# Patient Record
Sex: FEMALE | Race: WHITE | NOT HISPANIC OR LATINO | URBAN - METROPOLITAN AREA
[De-identification: names, ages, dates, MRNs, and addresses within clinical notes are randomized per-mention and may not be internally consistent; named-entity substitution may affect disease eponyms.]

---

## 2020-04-03 ENCOUNTER — OFFICE VISIT (OUTPATIENT)
Dept: URGENT CARE | Facility: CLINIC | Age: 75
End: 2020-04-03
Payer: COMMERCIAL

## 2020-04-03 ENCOUNTER — TELEPHONE (OUTPATIENT)
Dept: URGENT CARE | Facility: CLINIC | Age: 75
End: 2020-04-03

## 2020-04-03 VITALS
TEMPERATURE: 99 F | DIASTOLIC BLOOD PRESSURE: 94 MMHG | OXYGEN SATURATION: 95 % | WEIGHT: 165 LBS | HEART RATE: 82 BPM | SYSTOLIC BLOOD PRESSURE: 165 MMHG | HEIGHT: 64 IN | BODY MASS INDEX: 28.17 KG/M2

## 2020-04-03 DIAGNOSIS — R94.31 ABNORMAL EKG: ICD-10-CM

## 2020-04-03 DIAGNOSIS — R03.0 ELEVATED BLOOD PRESSURE READING: ICD-10-CM

## 2020-04-03 DIAGNOSIS — R07.89 OTHER CHEST PAIN: ICD-10-CM

## 2020-04-03 DIAGNOSIS — J06.9 UPPER RESPIRATORY TRACT INFECTION, UNSPECIFIED TYPE: ICD-10-CM

## 2020-04-03 DIAGNOSIS — R53.83 FATIGUE, UNSPECIFIED TYPE: Primary | ICD-10-CM

## 2020-04-03 LAB
CTP QC/QA: YES
FLUAV AG NPH QL: NEGATIVE
FLUBV AG NPH QL: NEGATIVE

## 2020-04-03 PROCEDURE — 87804 POCT INFLUENZA A/B: ICD-10-PCS | Mod: 59,QW,S$GLB, | Performed by: NURSE PRACTITIONER

## 2020-04-03 PROCEDURE — 99204 OFFICE O/P NEW MOD 45 MIN: CPT | Mod: 25,S$GLB,, | Performed by: NURSE PRACTITIONER

## 2020-04-03 PROCEDURE — 93005 EKG 12-LEAD: ICD-10-PCS | Mod: S$GLB,,, | Performed by: NURSE PRACTITIONER

## 2020-04-03 PROCEDURE — 99204 PR OFFICE/OUTPT VISIT, NEW, LEVL IV, 45-59 MIN: ICD-10-PCS | Mod: 25,S$GLB,, | Performed by: NURSE PRACTITIONER

## 2020-04-03 PROCEDURE — 71046 X-RAY EXAM CHEST 2 VIEWS: CPT | Mod: S$GLB,,, | Performed by: RADIOLOGY

## 2020-04-03 PROCEDURE — 87804 INFLUENZA ASSAY W/OPTIC: CPT | Mod: QW,S$GLB,, | Performed by: NURSE PRACTITIONER

## 2020-04-03 PROCEDURE — 93010 EKG 12-LEAD: ICD-10-PCS | Mod: S$GLB,,, | Performed by: INTERNAL MEDICINE

## 2020-04-03 PROCEDURE — 93010 ELECTROCARDIOGRAM REPORT: CPT | Mod: S$GLB,,, | Performed by: INTERNAL MEDICINE

## 2020-04-03 PROCEDURE — 93005 ELECTROCARDIOGRAM TRACING: CPT | Mod: S$GLB,,, | Performed by: NURSE PRACTITIONER

## 2020-04-03 PROCEDURE — 71046 XR CHEST PA AND LATERAL: ICD-10-PCS | Mod: S$GLB,,, | Performed by: RADIOLOGY

## 2020-04-03 NOTE — PROGRESS NOTES
"Subjective:       Patient ID: Nanette Caceres is a 74 y.o. female.    Vitals:  height is 5' 4" (1.626 m) and weight is 74.8 kg (165 lb). Her temperature is 99.2 °F (37.3 °C). Her blood pressure is 165/94 (abnormal) and her pulse is 82. Her oxygen saturation is 95%.     Chief Complaint: Sinus Problem    73 yo female presents today with chief complaint of sinus problem for 11 days. Pt also complains of postnasal drip and left-sided myalgias. Pt has PMH of pneumonia. Pt took Claritin and 400 mg ibuprofen (2x day) with mild relief.    Sinus Problem   This is a new problem. The current episode started 1 to 4 weeks ago. The problem is unchanged. There has been no fever. Her pain is at a severity of 0/10. She is experiencing no pain. Pertinent negatives include no chills, congestion, coughing, diaphoresis, ear pain, shortness of breath, sinus pressure or sore throat. Past treatments include acetaminophen and oral decongestants. The treatment provided mild relief.       Constitution: Positive for fatigue. Negative for chills, sweating, fever and international travel in last 60 days.   HENT: Positive for postnasal drip. Negative for ear pain, congestion, sinus pain, sinus pressure, sore throat and voice change.    Neck: Negative for painful lymph nodes.   Cardiovascular: Negative for chest pain, palpitations and sob on exertion.   Eyes: Negative for eye redness.   Respiratory: Negative for chest tightness, cough, sputum production, bloody sputum, COPD, shortness of breath, stridor, wheezing and asthma.    Gastrointestinal: Negative for nausea and vomiting.   Musculoskeletal: Positive for muscle ache.        Reports dull ache on left side of chest under her left arm.  States pain is not there all the time but notices pain at rest.  Denies any left-sided chest pain or palpitations.     Skin: Negative for rash.   Allergic/Immunologic: Negative for seasonal allergies, asthma and flu shot.   Hematologic/Lymphatic: Negative for " swollen lymph nodes.       Objective:      Physical Exam   Constitutional: She is oriented to person, place, and time. She appears well-developed and well-nourished. She is cooperative.  Non-toxic appearance. She does not have a sickly appearance. She does not appear ill. No distress.   Sitting upright in exam chair, breathing is unlabored, speaking in full sentences at ease and in NAD.   HENT:   Head: Normocephalic and atraumatic.   Right Ear: Hearing, tympanic membrane, external ear and ear canal normal.   Left Ear: Hearing, tympanic membrane, external ear and ear canal normal.   Nose: Nose normal. No mucosal edema, rhinorrhea or nasal deformity. No epistaxis. Right sinus exhibits no maxillary sinus tenderness and no frontal sinus tenderness. Left sinus exhibits no maxillary sinus tenderness and no frontal sinus tenderness.   Mouth/Throat: Uvula is midline and mucous membranes are normal. No trismus in the jaw. Normal dentition. No uvula swelling. Posterior oropharyngeal erythema present. No oropharyngeal exudate or posterior oropharyngeal edema.       Eyes: Pupils are equal, round, and reactive to light. Conjunctivae and lids are normal. No scleral icterus.   Neck: Trachea normal, normal range of motion, full passive range of motion without pain and phonation normal. Neck supple. No neck rigidity. No edema and no erythema present.   Cardiovascular: Normal rate, regular rhythm, S1 normal, S2 normal, normal heart sounds, intact distal pulses and normal pulses.   Pulmonary/Chest: Effort normal and breath sounds normal. No respiratory distress. She has no decreased breath sounds. She has no wheezes. She has no rhonchi. She has no rales.   Abdominal: Normal appearance.   Musculoskeletal: Normal range of motion. She exhibits no edema or deformity.   Neurological: She is alert and oriented to person, place, and time. She is not disoriented. She exhibits normal muscle tone. Coordination normal.   Skin: Skin is warm, dry,  intact, not diaphoretic and not pale.   Psychiatric: She has a normal mood and affect. Her speech is normal and behavior is normal. Judgment and thought content normal. Cognition and memory are normal.   Nursing note and vitals reviewed.        74 year old female with fatigue, PND, Left side of lung feel achy and mild discomfort.  Denies Cough, SOB, however may experience occasional HENNESSY with extensive exertion.  Denies chills or sweats.  Denies NVD, sore throat.  Denies loss of taste or smell.    PMH:  Pneumonia   Meds:  Clairitin and Ibuprofen  Smoke:  No  PSH:  None    Reports kids have colds (ages range from 3 to 9 years old).    Results for orders placed or performed in visit on 04/03/20   POCT Influenza A/B   Result Value Ref Range    Rapid Influenza A Ag Negative Negative    Rapid Influenza B Ag Negative Negative     Acceptable Yes       X-ray Chest Pa And Lateral    Result Date: 4/3/2020  EXAMINATION: XR CHEST PA AND LATERAL CLINICAL HISTORY: Other fatigue TECHNIQUE: PA and lateral views of the chest were performed. COMPARISON: None FINDINGS: The lungs are clear, with normal appearance of pulmonary vasculature and no pleural effusion or pneumothorax. The cardiac silhouette is normal in size. The hilar and mediastinal contours are unremarkable. Bones show no acute abnormalities.  There is aortic atherosclerosis.     No acute radiographic findings in the chest. Electronically signed by: Titus Knox MD Date:    04/03/2020 Time:    11:08    The EKG shows a abnormal, regular Sinus Rhythm, at a rate of 68, there are not ST Changes. There is not a previous EKG for comparison. This EKG was interpreted by me (and discussed with Dr. Liu).    Assessment:       1. Fatigue, unspecified type    2. Other chest pain    3. Abnormal EKG    4. Elevated blood pressure reading    5. Upper respiratory tract infection, unspecified type        Plan:       Consulted with Dr. Liu who agreed that the EKG was  abnormal.  Contacted patient shahla who sent a link to setup the patient though my chart.  Referral placed for [facility].  Patient states she will talk to her  and daughter-n-law who is a NP to see what she should do.  Patient states she does not really want to go to the ER with everything going on with Coronavirus.  Strongly recommended immediate follow-up on her current symptoms.  Advised to connect with the scheduling link provided to obtain the first available appointment today and/or go to the ER for any increasing pain, change in symptoms or new symptoms.  Patient requested a copy of her EKG - printed and given with discharge paperwork.  Patient left the exam room and walked to the care unassisted, at ease and in NAD.    Discharge instructions reviewed with patient and her  who both verbalized understanding and agreed with plan of care.        Fatigue, unspecified type  -     POCT Influenza A/B  -     EKG 12-lead  -     Ambulatory referral/consult to Cardiology    Other chest pain  -     X-Ray Chest PA And Lateral; Future; Expected date: 04/03/2020  -     Ambulatory referral/consult to Cardiology    Abnormal EKG  -     Ambulatory referral/consult to Cardiology    Elevated blood pressure reading  -     Ambulatory referral/consult to Cardiology    Upper respiratory tract infection, unspecified type      Patient Instructions     General Referral to [facility]   You were referred to [facility] for Follow-up Care.    Please call [phone] to schedule your appointment.    Please go to the Emergency Department for any concerns or worsening of condition.  Please follow up with your primary care doctor or specialist in the next 48-72hrs as needed.    If you  smoke, please stop smoking.    .

## 2020-04-03 NOTE — PATIENT INSTRUCTIONS
General Referral to [facility]   You were referred to [facility] for Follow-up Care.    Please call [phone] to schedule your appointment.    Please go to the Emergency Department for any concerns or worsening of condition.  Please follow up with your primary care doctor or specialist in the next 48-72hrs as needed.    If you  smoke, please stop smoking.    .

## 2020-04-03 NOTE — TELEPHONE ENCOUNTER
Received a call from patient asking if she had to get immediate follow-up on her abnormal EKG and current symptoms.  Advised patient that if she was not able to get into a cardiology visit she needed to go to the ER.  Patient stated she did not want to go to the ER because of COVID exposure.  Acknowledge concerns about COVID exposure but reiterated the importance of following up on current symptoms and abnormal EKG. Patient verbalized understanding and agreed with plan of care.

## 2020-04-04 ENCOUNTER — TELEPHONE (OUTPATIENT)
Dept: URGENT CARE | Facility: CLINIC | Age: 75
End: 2020-04-04
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
## 2020-04-06 ENCOUNTER — OFFICE VISIT (OUTPATIENT)
Dept: CARDIOLOGY | Facility: CLINIC | Age: 75
End: 2020-04-06
Payer: COMMERCIAL

## 2020-04-06 DIAGNOSIS — R06.02 SHORTNESS OF BREATH: ICD-10-CM

## 2020-04-06 DIAGNOSIS — R07.89 CHEST WALL DISCOMFORT: ICD-10-CM

## 2020-04-06 DIAGNOSIS — R53.83 OTHER FATIGUE: ICD-10-CM

## 2020-04-06 PROCEDURE — 99204 OFFICE O/P NEW MOD 45 MIN: CPT | Mod: 95,,, | Performed by: INTERNAL MEDICINE

## 2020-04-06 PROCEDURE — 99204 PR OFFICE/OUTPT VISIT, NEW, LEVL IV, 45-59 MIN: ICD-10-PCS | Mod: 95,,, | Performed by: INTERNAL MEDICINE

## 2020-04-06 PROCEDURE — 1159F PR MEDICATION LIST DOCUMENTED IN MEDICAL RECORD: ICD-10-PCS | Mod: 95,,, | Performed by: INTERNAL MEDICINE

## 2020-04-06 PROCEDURE — 1159F MED LIST DOCD IN RCRD: CPT | Mod: 95,,, | Performed by: INTERNAL MEDICINE

## 2020-04-06 NOTE — LETTER
April 6, 2020      Alyssa Ryan, NP  [address] [facility] [address]           [facility]  [address]  [place] [address]  Phone: [phone]          Patient: Nanette Caceres   MR Number: [id number]   YOB: 1945   Date of Visit: 4/6/2020       Dear Alyssa Ryan:    Thank you for referring Nanette Caceres to me for evaluation. Attached you will find relevant portions of my assessment and plan of care.    If you have questions, please do not hesitate to call me. I look forward to following Nanette Caceres along with you.    Sincerely,    Jean Patricio III, MD    Enclosure  CC:  No Recipients    If you would like to receive this communication electronically, please contact [email or web address] or [phone] to request more information on [facility] access.    For providers and/or their staff who would like to refer a patient to [facility], please contact us through our one-stop-shop provider referral line, [facility] , at [phone].    If you feel you have received this communication in error or would no longer like to receive these types of communications, please e-mail [email or web address]          Patient with one or more new problems requiring additional work-up/treatment.

## 2020-04-06 NOTE — PROGRESS NOTES
"The patient location is: [place]  The chief complaint leading to consultation is: CP, EKG   Visit type: Virtual visit with synchronous audio and video  Total time spent with patient: 12 minutes  Each patient to whom he or she provides medical services by telemedicine is:  (1) informed of the relationship between the physician and patient and the respective role of any other health care provider with respect to management of the patient; and (2) notified that he or she may decline to receive medical services by telemedicine and may withdraw from such care at any time.          Cardiology Clinic Note  Reason for Visit: fatigue/SOB, abnormal EKG    HPI:     Nanette Caceres is a 74 y.o. F, who was referred for fatigue, SOB, and "abnormal EKG." This is a virtual visit performed during the coronavirus pandemic.    She reports that she has been under a great amount of stress recently. She watches 4 grand kids and has children/daughter-in-laws who are nurses. Her son is RN in the ED. She has two daughter in laws who are RNs. For the past few weeks, she has been feeling more fatigued and short of breath. She went to an urgent care on 4/3. She reports being very anxious, and her BP was elevated; this is not normal. A CXR was performed and normal. EKG was performed and showed NSR, iRBBB.    Prior to onset of symptoms, she reports walking at least 1.5 miles 3-4x/week with her . Her  has trouble keeping up due to knee trouble. She does this exertion without ischemic symptoms. She does occasionally get short of breath while rushing but otherwise no issue. She does have L sided chest discomfort, but this began following her grand-daughter elbowing her in that location. This is gradually improving. No symptoms of HF, arrhythmia, claudication.    Medical: none  Surgical: biopsy of breast (benign)  Family: mother with ulcerative colitis,  at age 96yo; father  at 45yo due to brain cancer; older sister with " Alzheimers at age 87yo; no premature CAD, HF, SCD  Social: never smoked, no alcohol use;  56yrs    ROS:    Constitution: Negative for fever or chills.  HENT: Negative for  headaches.  Eyes: Negative for blurred vision.   Cardiovascular: See above  Pulmonary: Occasional SOB. Negative for cough.   Gastrointestinal: Negative for nausea/vomiting.   : Negative for dysuria.   Skin: Negative for rashes.  Neurological: Negative for focal weakness.  Psychological: Negative for depression.  PMH:   No past medical history on file.  Past Surgical History:   Procedure Laterality Date    biopsy       Allergies:     Review of patient's allergies indicates:   Allergen Reactions    Amoxicillin     Bactrim [sulfamethoxazole-trimethoprim]     Clarithromycin     Doxycycline     Levofloxacin     Tomato (solanum lycopersicum)      Medications:     No current outpatient medications on file prior to visit.     No current facility-administered medications on file prior to visit.      Social History:     Social History     Tobacco Use    Smoking status: Never Smoker    Smokeless tobacco: Never Used   Substance Use Topics    Alcohol use: Never     Frequency: Never     Family History:     Family History   Problem Relation Age of Onset    Ulcerative colitis Mother     Cancer Father     Alzheimer's disease Sister      Physical Exam:   There were no vitals taken for this visit.     Physical not exam was not performed due this encounter being a virtual visit.     Labs:     Blood Tests:  None    Urine Tests:  None    Imaging:     Echocardiogram  None    Stress testing  None    Cath Lab  None    Other  None    EK/3/20  Normal sinus rhythm  iRBBB  Otherwise normal ECG    Assessment:     1. Shortness of breath    2. Other fatigue    3. Chest wall discomfort        Plan:     Chest wall discomfort  Shortness of breath  Fatigue  She has non-anginal symptoms by history. She has no cardiac risk factors other than age.   EKG  shows sinus rhythm with incomplete RBBB. No prior EKG for comparison.  She has a great exercise tolerance.   Discussed symptoms that would be concerning for heart disease and instructed to notify me of changes.  No further cardiac evaluation at this time.    Signed:  Brennan Patricio MD  Cardiology     4/6/2020 8:20 AM    Follow-up:     Future Appointments   Date Time Provider Department Center   4/6/2020  8:00 AM Jean Patricio III, MD [facility]

## 2020-09-09 ENCOUNTER — OFFICE VISIT (OUTPATIENT)
Dept: URGENT CARE | Facility: CLINIC | Age: 75
End: 2020-09-09
Payer: COMMERCIAL

## 2020-09-09 VITALS
SYSTOLIC BLOOD PRESSURE: 133 MMHG | DIASTOLIC BLOOD PRESSURE: 88 MMHG | BODY MASS INDEX: 28.17 KG/M2 | HEIGHT: 64 IN | WEIGHT: 165 LBS | TEMPERATURE: 98 F | OXYGEN SATURATION: 95 % | HEART RATE: 108 BPM | RESPIRATION RATE: 20 BRPM

## 2020-09-09 DIAGNOSIS — J30.2 SEASONAL ALLERGIES: Primary | ICD-10-CM

## 2020-09-09 LAB
CTP QC/QA: YES
SARS-COV-2 RDRP RESP QL NAA+PROBE: NEGATIVE

## 2020-09-09 PROCEDURE — U0002 COVID-19 LAB TEST NON-CDC: HCPCS | Mod: S$GLB,,, | Performed by: FAMILY MEDICINE

## 2020-09-09 PROCEDURE — U0002: ICD-10-PCS | Mod: S$GLB,,, | Performed by: FAMILY MEDICINE

## 2020-09-09 PROCEDURE — 99214 PR OFFICE/OUTPT VISIT, EST, LEVL IV, 30-39 MIN: ICD-10-PCS | Mod: S$GLB,,, | Performed by: FAMILY MEDICINE

## 2020-09-09 PROCEDURE — 99214 OFFICE O/P EST MOD 30 MIN: CPT | Mod: S$GLB,,, | Performed by: FAMILY MEDICINE

## 2020-09-09 RX ORDER — FLUTICASONE PROPIONATE 50 MCG
1 SPRAY, SUSPENSION (ML) NASAL DAILY
Qty: 9.9 ML | Refills: 0 | Status: SHIPPED | OUTPATIENT
Start: 2020-09-09

## 2020-09-09 NOTE — PROGRESS NOTES
"Subjective:       Patient ID: Nanette Caceres is a 75 y.o. female.    Vitals:  height is 5' 4" (1.626 m) and weight is 74.8 kg (165 lb). Her temperature is 97.7 °F (36.5 °C). Her blood pressure is 133/88 and her pulse is 108. Her respiration is 20 and oxygen saturation is 95%.     Chief Complaint: COVID-19 Concerns    This is a 75 y.o. female who presents today with a chief complaint of   Fever last night and this am of 100.8 pt took Advil. Pt also c/o body aches and chills, and some sinus congestion    Fever   This is a new problem. The current episode started yesterday. The problem occurs intermittently. The problem has been unchanged. The temperature was taken using an oral thermometer. Associated symptoms include congestion, headaches and nausea. Pertinent negatives include no coughing, ear pain, rash, sore throat, vomiting or wheezing. She has tried NSAIDs for the symptoms. The treatment provided moderate relief.       Constitution: Positive for chills, fatigue and fever. Negative for sweating.   HENT: Positive for congestion and sinus pressure. Negative for ear pain, sinus pain, sore throat and voice change.    Neck: Negative for painful lymph nodes.   Eyes: Negative for eye redness.   Respiratory: Negative for chest tightness, cough, sputum production, bloody sputum, COPD, shortness of breath, stridor, wheezing and asthma.    Gastrointestinal: Positive for nausea. Negative for vomiting.   Musculoskeletal: Positive for muscle ache.   Skin: Negative for rash.   Allergic/Immunologic: Negative for seasonal allergies and asthma.   Neurological: Positive for headaches.   Hematologic/Lymphatic: Negative for swollen lymph nodes.       Objective:      Physical Exam   HENT:   Head: Normocephalic and atraumatic.   Nose: Rhinorrhea (clear) and congestion present.   Mouth/Throat: Posterior oropharyngeal erythema present. Tonsils are 1+ on the right. Tonsils are 1+ on the left. No tonsillar exudate.   Abdominal: Normal " appearance.   Neurological: She is alert.   Nursing note and vitals reviewed.    Results for orders placed or performed in visit on 09/09/20   POCT COVID-19 Rapid Screening   Result Value Ref Range    POC Rapid COVID Negative Negative     Acceptable Yes          Assessment:       1. Seasonal allergies        Plan:         Seasonal allergies  -     POCT COVID-19 Rapid Screening  -     fluticasone propionate (FLONASE) 50 mcg/actuation nasal spray; 1 spray (50 mcg total) by Each Nostril route once daily.  Dispense: 9.9 mL; Refill: 0    recommended claritin and mucinex OTC. RTC prn worsening symptoms

## 2020-09-09 NOTE — PATIENT INSTRUCTIONS
Seasonal Allergy  Seasonal allergy is also called hay fever. It may occur after a person is exposed to pollens released from grasses, weeds, trees and shrubs. This type of allergy occurs during the spring and summer when the pollen contacts the lining of the nose, eyes, eyelids, sinuses and throat. This causes histamine to be released from the tissues. Histamine causes itching and swelling. This may produce a watery discharge from the eyes or nose. Violent sneezing, nasal congestion, post-nasal drip, itching of the eyes, nose, throat and mouth, scratchy throat, and dry cough may also occur.  Home care  Seasonal allergy cannot be cured, but symptoms can be reduced by these measures:  · Avoid or reduce exposure to the allergen as much as you can:    ¨ Stay indoors on windy days of pollen season.   ¨ Keep windows and doors closed. Use air conditioning instead in your home and car. This filters the air.  ¨ Change air conditioner filters often.  ¨ Take a shower, wash your hair, and change clothes after being outdoors.  ¨ Put on a NIOSH-rated 95 filter mask when working outdoors. Before going outside, take your allergy medicine as advised by your healthcare provider.  · Decongestant pills and sprays reduce tissue swelling and watery discharge. Overuse of nasal decongestant sprays may make symptoms worse. Do not use these more often than recommended. Sometimes you can experience a rebound effect (symptoms worsen), when stopping them. Talk to your healthcare provider or pharmacist about these medicines before taking them, especially if you have high blood pressure or heart problems.   · Antihistamines block the release of histamine during the allergic response. They work better when taken before symptoms develop. Unless a prescription antihistamine was prescribed, you can take over-the-counter antihistamines that do not cause drowsiness.  Ask your pharmacist for suggestions.  · Steroid nasal sprays or oral steroids may  also be prescribed for more severe symptoms. These help to reduce the local inflammation that can add to the allergic response.  · If you have asthma, pollen season may make your asthma symptoms worse. It is important that you use your asthma medicines as directed during this time to prevent or treat attacks. Some persons with asthma have asthma symptoms that get worse when they take antihistamines. This is due to the drying effect on the lungs. If you notice this, stop the antihistamines, drink extra fluids and notify your doctor.  · If you have sinus congestion or drainage, a saline nasal rinse may give relief. A saline nasal rinse lessens the swelling and clears excess mucus. This allows sinuses to drain. Prepackaged kits are sold at most drug stores. These contain pre-mixed salt packets and an irrigation device.  Follow-up care  Follow up with your healthcare provider or as directed. If you have been referred to a specialist, make an appointment promptly.  When to seek medical advice  Call your healthcare provider for any of the following:  · Facial, ear or sinus pain; colored drainage from the nose  · Headaches  · You have asthma and your asthma symptoms do not respond to the usual doses of your medicine  · Cough with colored sputum (mucus)  · Fever of 100.4°F (38°C) or higher, or as directed by the healthcare provider  Call 911 if any of these occur:  · Trouble breathing or swallowing, wheezing  · Hoarse voice, trouble speaking, or drooling  · Confusion  · Very drowsy or trouble awakening  · Fainting or loss of consciousness  · Rapid heart rate, or weak pulse  · Low blood pressure  · Feeling of doom  · Nausea, vomiting, abdominal pain, diarrhea  · Vomiting blood, or large amounts of blood in stool  · Seizure  · Cold, moist, or pale (blue in color) skin  Date Last Reviewed: 5/1/2017  © 6433-5274 [employer]. [address] [place] [address], [place] [address]. All rights reserved. This information is not  intended as a substitute for professional medical care. Always follow your healthcare professional's instructions.